# Patient Record
Sex: MALE | Race: WHITE | Employment: FULL TIME | URBAN - METROPOLITAN AREA
[De-identification: names, ages, dates, MRNs, and addresses within clinical notes are randomized per-mention and may not be internally consistent; named-entity substitution may affect disease eponyms.]

---

## 2024-05-06 ENCOUNTER — HOSPITAL ENCOUNTER (OUTPATIENT)
Facility: HOSPITAL | Age: 64
Setting detail: OUTPATIENT SURGERY
Discharge: HOME | End: 2024-05-06
Attending: INTERNAL MEDICINE | Admitting: INTERNAL MEDICINE
Payer: COMMERCIAL

## 2024-05-06 VITALS
BODY MASS INDEX: 25.32 KG/M2 | SYSTOLIC BLOOD PRESSURE: 101 MMHG | TEMPERATURE: 97.6 F | WEIGHT: 180.9 LBS | HEIGHT: 71 IN | HEART RATE: 58 BPM | OXYGEN SATURATION: 95 % | RESPIRATION RATE: 17 BRPM | DIASTOLIC BLOOD PRESSURE: 71 MMHG

## 2024-05-06 PROCEDURE — 3600007512: Performed by: INTERNAL MEDICINE

## 2024-05-06 PROCEDURE — 3600007502: Performed by: INTERNAL MEDICINE

## 2024-05-06 PROCEDURE — 88305 TISSUE EXAM BY PATHOLOGIST: CPT

## 2024-05-06 PROCEDURE — 7100000011 HC PHASE II RECOVERY - ADDTL 15 MIN: Performed by: INTERNAL MEDICINE

## 2024-05-06 PROCEDURE — 7100000010 HC PHASE II RECOVERY - FIRST 15 MIN: Performed by: INTERNAL MEDICINE

## 2024-05-06 PROCEDURE — 2709999900 HC NON-CHARGEABLE SUPPLY: Performed by: INTERNAL MEDICINE

## 2024-05-06 PROCEDURE — 99153 MOD SED SAME PHYS/QHP EA: CPT | Performed by: INTERNAL MEDICINE

## 2024-05-06 PROCEDURE — 6360000002 HC RX W HCPCS: Performed by: INTERNAL MEDICINE

## 2024-05-06 PROCEDURE — 2580000003 HC RX 258: Performed by: INTERNAL MEDICINE

## 2024-05-06 PROCEDURE — 99152 MOD SED SAME PHYS/QHP 5/>YRS: CPT | Performed by: INTERNAL MEDICINE

## 2024-05-06 RX ORDER — SIMVASTATIN 10 MG
10 TABLET ORAL NIGHTLY
COMMUNITY

## 2024-05-06 RX ORDER — SODIUM CHLORIDE 9 MG/ML
INJECTION, SOLUTION INTRAVENOUS CONTINUOUS
Status: DISCONTINUED | OUTPATIENT
Start: 2024-05-06 | End: 2024-05-06 | Stop reason: HOSPADM

## 2024-05-06 RX ORDER — FENTANYL CITRATE 50 UG/ML
INJECTION, SOLUTION INTRAMUSCULAR; INTRAVENOUS PRN
Status: DISCONTINUED | OUTPATIENT
Start: 2024-05-06 | End: 2024-05-06 | Stop reason: ALTCHOICE

## 2024-05-06 RX ORDER — MELATONIN
1000 DAILY
COMMUNITY

## 2024-05-06 RX ORDER — MIDAZOLAM HYDROCHLORIDE 1 MG/ML
INJECTION INTRAMUSCULAR; INTRAVENOUS PRN
Status: DISCONTINUED | OUTPATIENT
Start: 2024-05-06 | End: 2024-05-06 | Stop reason: ALTCHOICE

## 2024-05-06 RX ADMIN — SODIUM CHLORIDE: 9 INJECTION, SOLUTION INTRAVENOUS at 08:58

## 2024-05-06 ASSESSMENT — PAIN - FUNCTIONAL ASSESSMENT
PAIN_FUNCTIONAL_ASSESSMENT: NONE - DENIES PAIN
PAIN_FUNCTIONAL_ASSESSMENT: ADULT NONVERBAL PAIN SCALE (NPVS)
PAIN_FUNCTIONAL_ASSESSMENT: 0-10
PAIN_FUNCTIONAL_ASSESSMENT: NONE - DENIES PAIN
PAIN_FUNCTIONAL_ASSESSMENT: ADULT NONVERBAL PAIN SCALE (NPVS)
PAIN_FUNCTIONAL_ASSESSMENT: ADULT NONVERBAL PAIN SCALE (NPVS)
PAIN_FUNCTIONAL_ASSESSMENT: NONE - DENIES PAIN
PAIN_FUNCTIONAL_ASSESSMENT: ADULT NONVERBAL PAIN SCALE (NPVS)

## 2024-05-06 NOTE — PROCEDURES
[facility]  Colonoscopy Procedure Report  _______________________________________________________  Patient: Chilo Fink                                        Attending Physician: ENMA Mccabe MD    Patient ID: [id number]                                    Referring Physician: Tera Diana MD    Exam Date: 5/6/2024     Introduction: A  63 y.o. male patient, presents for inpatient Colonoscopy    Indications: 64 yo male a patient of Dr Diana coming for second screening colonoscopy. his last was negative on 5/24/ 2011. He has average risk for colon cancer. Asymptomatic from GI point of view. he has one bm daily. had inguinal hernia repair otherwise in excellent health. he does have borderline Thrombocytopenia and polycythemia. 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 no smoking or alcohol abuse.     Consent: The benefits, risks, and alternatives to the procedure were discussed and informed consent was obtained from the patient.    Preparation: EKG, pulse, pulse oximetry and blood pressure were monitored throughout the procedure. ASA Classification: Class II- . The heart is an S1-S2 and regular heart rate and rhythm. Lungs are clear to auscultation and percussion. Abdomen is soft, nondistended, and nontender. Mental Status: awake, alert, and oriented to person, place, and time    Medications:  Fentanyl 100 mcg IV before procedure.  Versed 4 mg IV, throughout the procedure.    Rectal Exam: Normal Rectal Exam. No Blood.     Pathology Specimens:  1    Procedure:  The colonoscope was passed with ease through the anus under direct visualization and advanced to the cecum and 5 cm inside the terminal ileum. Retroflexion is made in the ascending colon. The patient required some pressure to aid in the passage of the scope. The scope was withdrawn and the mucosa was carefully examined. The quality of the

## 2024-05-06 NOTE — H&P
Assessment/Plan  # Detail Type Description    1. Assessment Encounter for screening for malignant neoplasm of rectum (Z12.12).    Patient Plan a pleasant 62 yo male a patient of Dr Diana coming for second screening colonoscopy. his last was negative on 5/24/ 2011. He has average risk for colon cancer. Asymptomatic from GI point of view. he has one bm daily. had inguinal hernia repair otherwise in excellent health. he does have borderline Thrombocytopenia and polycythemia. 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 no smoking or alcohol abuse. I explained to the patient the procedure of colonoscopy and the risks involved, including but not limited to bleeding, perforation, infection or missing a lesion if the bowels are not well clean or are unusually tortuous and difficult.  He was agreeable to proceed. I gave him the  approved prep to clean the bowel.              This 62 year old patient presents for Colon Cancer Screening.    History of Present Illness  1.  Colon Cancer Screening   No prior screening.  Denies risk factors.  Pertinent negatives include abdominal pain, change in bowel habits, change in stool caliber, constipation, decreased appetite, diarrhea, melena, nausea, rectal bleeding, vomiting, weight gain and weight loss.  Additional information: No family history of colon cancer and last colonoscopy   5/24/2011  Bm.2 x daily.          Problem List  Problem Description Onset Date Chronic Clinical Status Notes   Derangement of medial meniscus 12/05/2014 N     Hyperlipidemia  Y     Vitamin D deficiency  Y         Medications (active prior to today)  Medication Name Sig Description Start Date Stop Date Refilled Rx Elsewhere   Vitamin D3 2,000 unit Tab inhale 1 by Oral route  every day 03/12/2012   N   multivitamin tablet take 1 tablet by oral route  every day with food 06/11/2019   N   Co Q-10 10 mg capsule  06/11/2019

## 2024-05-06 NOTE — PRE SEDATION
Sedation Pre-Procedure Note    Patient Name: Chilo Fink   YOB: 1960  Room/Bed: ENDO/PL  Medical Record Number: [id number]  Date: 5/6/2024   Time: 10:30 AM       Indication:  screen for colon cancer    Consent: I have discussed with the patient and/or the patient representative the indication, alternatives, and the possible risks and/or complications of the planned procedure and the anesthesia methods. The patient and/or patient representative appear to understand and agree to proceed.    Vital Signs:   Vitals:    05/06/24 0852   BP: 123/86   Pulse: 70   Resp: 16   Temp: 97.7 °F (36.5 °C)   SpO2: 98%       Past Medical History:   has no past medical history on file.    Past Surgical History:   has a past surgical history that includes Inguinal hernia repair.    Medications:   Scheduled Meds:   Continuous Infusions:    sodium chloride 100 mL/hr at 05/06/24 0858     PRN Meds:   Home Meds:   Prior to Admission medications    Medication Sig Start Date End Date Taking? Authorizing Provider   Multiple Vitamin (MULTI VITAMIN PO) Take 1 tablet by mouth daily   Yes Vinod Chaudhary MD   simvastatin (ZOCOR) 10 MG tablet Take 1 tablet by mouth nightly    Vinod Chaudhary MD   Coenzyme Q10 10 MG CAPS Take by mouth    Vinod Chaudhary MD   vitamin D3 (CHOLECALCIFEROL) 25 MCG (1000 UT) TABS tablet Take 1 tablet by mouth daily    Vinod Chaudhary MD     Coumadin Use Last 7 Days:  no  Antiplatelet drug therapy use last 7 days: no  Other anticoagulant use last 7 days: no  Additional Medication Information:  None      Pre-Sedation Documentation and Exam:   Vital signs have been reviewed (see flow sheet for vitals).  I have reviewed the patient's history and review of systems.    Mallampati Airway Assessment:  normal, dentition not prohibitive, normal neck range of motion, Mallampati Class III - (soft palate & base of uvula are visible)    Prior History of Anesthesia Complications:   none    ASA

## 2024-05-06 NOTE — DISCHARGE INSTRUCTIONS
Chilo Fink  [id number]  1960    COLON DISCHARGE INSTRUCTIONS    Discomfort:  Redness at IV site- apply warm compress to area; if redness or soreness persist- contact your physician  There may be a slight amount of blood passed from the rectum  Gaseous discomfort- walking, belching will help relieve any discomfort  You may not operate a vehicle til the next day.  You may not engage in an occupation involving machinery or appliances til the next day.  You may not drink alcoholic beverages til the next day.    DIET:   High fiber diet.     ACTIVITY:  You may not  resume your normal daily activities til the next day. it is recommended that you spend the remainder of the day resting -  avoid any strenuous activity.    CALL M.D.  IF ANY SIGN OF:   Increasing pain, nausea, vomiting  Abdominal distension (swelling)  New increased bleeding (oral or rectal)  Fever (chills)  Pain in chest area  Bloody discharge from nose or mouth  Shortness of breath    You may not  take any Advil, Aspirin, Ibuprofen, Motrin, Aleve, or Goody’s for 3 days, ONLY  Tylenol as needed for pain.    Post procedure diagnosis:   Slightly tortuous sigmoid colon. With mild sigmoid diverticulosis. 2 small 3 mm sessile polyps in the transverse colon, cold snared.     Follow-up Instructions:   Your follow up colonoscopy will be in 10 years.    We will notify you the results of your biopsy by letter within 2 weeks.    Kee Mccabe MD  May 6, 2024

## (undated) DEVICE — TOURNIQUET PHLEB W1XL18IN BLU FLAT RL AND BND REUSE FOR IV

## (undated) DEVICE — BLUNTFILL: Brand: MONOJECT

## (undated) DEVICE — WRISTBAND ID AD W2.5XL9.5CM RED VYN ADH CLSR UNI-PRINT

## (undated) DEVICE — SYRINGE MEDICAL 3ML CLEAR PLASTIC STANDARD NON CONTROL LUERLOCK TIP DISPOSABLE

## (undated) DEVICE — SYRINGE 50ML E/T

## (undated) DEVICE — Device

## (undated) DEVICE — SYRINGE MED 5ML STD CLR PLAS LUERLOCK TIP N CTRL DISP

## (undated) DEVICE — SOLUTION IV 1000ML 0.9% SOD CHL PH 5 INJ USP VIAFLX PLAS

## (undated) DEVICE — CATHETER IV 22GA L1IN BLU POLYUR STR HUB RADPQ PROTCT +

## (undated) DEVICE — TRAP SPEC POLYP REM STRNR CLN DSGN MAGNIFYING WIND DISP

## (undated) DEVICE — CATHETER PH SUCT 14FR

## (undated) DEVICE — KENDALL RADIOLUCENT FOAM MONITORING ELECTRODE RECTANGULAR SHAPE: Brand: KENDALL

## (undated) DEVICE — TUBING, SUCTION, 1/4" X 12', STRAIGHT: Brand: MEDLINE

## (undated) DEVICE — CANNULA CUSH AD W/ 14FT TBG

## (undated) DEVICE — SNARE POLYP SM W13MMXL240CM SHTH DIA2.4MM OVL FLX DISP